# Patient Record
Sex: FEMALE | Race: WHITE | URBAN - METROPOLITAN AREA
[De-identification: names, ages, dates, MRNs, and addresses within clinical notes are randomized per-mention and may not be internally consistent; named-entity substitution may affect disease eponyms.]

---

## 2017-01-03 ENCOUNTER — OFFICE VISIT (OUTPATIENT)
Dept: FAMILY MEDICINE | Facility: CLINIC | Age: 22
End: 2017-01-03
Payer: COMMERCIAL

## 2017-01-03 VITALS
WEIGHT: 211 LBS | RESPIRATION RATE: 18 BRPM | HEART RATE: 119 BPM | BODY MASS INDEX: 33.91 KG/M2 | DIASTOLIC BLOOD PRESSURE: 86 MMHG | HEIGHT: 66 IN | SYSTOLIC BLOOD PRESSURE: 133 MMHG | OXYGEN SATURATION: 99 % | TEMPERATURE: 98.7 F

## 2017-01-03 DIAGNOSIS — F98.8 ADD (ATTENTION DEFICIT DISORDER): Primary | ICD-10-CM

## 2017-01-03 DIAGNOSIS — Z23 NEED FOR PROPHYLACTIC VACCINATION AND INOCULATION AGAINST INFLUENZA: ICD-10-CM

## 2017-01-03 DIAGNOSIS — J31.0 RHINITIS, UNSPECIFIED TYPE: ICD-10-CM

## 2017-01-03 DIAGNOSIS — R03.0 ELEVATED BLOOD PRESSURE READING WITHOUT DIAGNOSIS OF HYPERTENSION: ICD-10-CM

## 2017-01-03 PROCEDURE — 90471 IMMUNIZATION ADMIN: CPT | Performed by: INTERNAL MEDICINE

## 2017-01-03 PROCEDURE — 90686 IIV4 VACC NO PRSV 0.5 ML IM: CPT | Performed by: INTERNAL MEDICINE

## 2017-01-03 PROCEDURE — 99214 OFFICE O/P EST MOD 30 MIN: CPT | Mod: 25 | Performed by: INTERNAL MEDICINE

## 2017-01-03 RX ORDER — LORATADINE 10 MG/1
10 TABLET ORAL DAILY PRN
Qty: 30 TABLET | Refills: 1 | Status: SHIPPED | OUTPATIENT
Start: 2017-01-03

## 2017-01-03 RX ORDER — DEXTROAMPHETAMINE SACCHARATE, AMPHETAMINE ASPARTATE, DEXTROAMPHETAMINE SULFATE AND AMPHETAMINE SULFATE 3.75; 3.75; 3.75; 3.75 MG/1; MG/1; MG/1; MG/1
15 TABLET ORAL 2 TIMES DAILY
Qty: 60 TABLET | Refills: 0 | Status: SHIPPED | OUTPATIENT
Start: 2017-02-28 | End: 2017-04-26

## 2017-01-03 RX ORDER — DEXTROAMPHETAMINE SACCHARATE, AMPHETAMINE ASPARTATE, DEXTROAMPHETAMINE SULFATE AND AMPHETAMINE SULFATE 3.75; 3.75; 3.75; 3.75 MG/1; MG/1; MG/1; MG/1
15 TABLET ORAL 2 TIMES DAILY
Qty: 60 TABLET | Refills: 0 | Status: SHIPPED | OUTPATIENT
Start: 2017-03-29 | End: 2017-04-26

## 2017-01-03 RX ORDER — DEXTROAMPHETAMINE SACCHARATE, AMPHETAMINE ASPARTATE, DEXTROAMPHETAMINE SULFATE AND AMPHETAMINE SULFATE 1.875; 1.875; 1.875; 1.875 MG/1; MG/1; MG/1; MG/1
7.5 TABLET ORAL DAILY
Qty: 30 TABLET | Refills: 0 | Status: CANCELLED | OUTPATIENT
Start: 2017-01-03

## 2017-01-03 RX ORDER — DEXTROAMPHETAMINE SACCHARATE, AMPHETAMINE ASPARTATE, DEXTROAMPHETAMINE SULFATE AND AMPHETAMINE SULFATE 3.75; 3.75; 3.75; 3.75 MG/1; MG/1; MG/1; MG/1
15 TABLET ORAL 2 TIMES DAILY
Qty: 60 TABLET | Refills: 0 | Status: SHIPPED | OUTPATIENT
Start: 2017-01-30 | End: 2017-04-26

## 2017-01-03 RX ORDER — DEXTROAMPHETAMINE SACCHARATE, AMPHETAMINE ASPARTATE MONOHYDRATE, DEXTROAMPHETAMINE SULFATE AND AMPHETAMINE SULFATE 6.25; 6.25; 6.25; 6.25 MG/1; MG/1; MG/1; MG/1
25 CAPSULE, EXTENDED RELEASE ORAL DAILY
Qty: 30 CAPSULE | Refills: 0 | Status: CANCELLED | OUTPATIENT
Start: 2017-01-03

## 2017-01-03 RX ORDER — FLUTICASONE PROPIONATE 50 MCG
1 SPRAY, SUSPENSION (ML) NASAL
Qty: 1 BOTTLE | Refills: 3 | Status: SHIPPED | OUTPATIENT
Start: 2017-01-03

## 2017-01-03 ASSESSMENT — ANXIETY QUESTIONNAIRES
1. FEELING NERVOUS, ANXIOUS, OR ON EDGE: NOT AT ALL
2. NOT BEING ABLE TO STOP OR CONTROL WORRYING: NOT AT ALL
7. FEELING AFRAID AS IF SOMETHING AWFUL MIGHT HAPPEN: NOT AT ALL
IF YOU CHECKED OFF ANY PROBLEMS ON THIS QUESTIONNAIRE, HOW DIFFICULT HAVE THESE PROBLEMS MADE IT FOR YOU TO DO YOUR WORK, TAKE CARE OF THINGS AT HOME, OR GET ALONG WITH OTHER PEOPLE: NOT DIFFICULT AT ALL
3. WORRYING TOO MUCH ABOUT DIFFERENT THINGS: NOT AT ALL
GAD7 TOTAL SCORE: 0
6. BECOMING EASILY ANNOYED OR IRRITABLE: NOT AT ALL
5. BEING SO RESTLESS THAT IT IS HARD TO SIT STILL: NOT AT ALL

## 2017-01-03 ASSESSMENT — PATIENT HEALTH QUESTIONNAIRE - PHQ9: 5. POOR APPETITE OR OVEREATING: NOT AT ALL

## 2017-01-03 NOTE — MR AVS SNAPSHOT
After Visit Summary   1/3/2017    Aida Crook    MRN: [id number]           Patient Information     Date Of Birth          1995        Visit Information        Provider Department      1/3/2017 1:00 PM Xiomara Li MD [facility]        Today's Diagnoses     ADD (attention deficit disorder)    -  1     Need for prophylactic vaccination and inoculation against influenza         Elevated blood pressure reading without diagnosis of hypertension         Rhinitis, unspecified type           Care Instructions    Discontinue Adderall XR due to insurance reason.  Start taking Adderall 15 mg twice daily.  Monitor your blood pressure once a week  at home.  Bring those readings on your next visit.  Notify us if your blood pressure readings consistently stays greater than 140/90.  Schedule you physical in 3 months  Drink plenty of fluids.  Take extra rest.  Use saline nasal spray.  Take Tylenol as needed for pain  Use Claritin 10 mg daily as needed.  Use Flonase nasal  spray  Seek sooner medical attention if there is any worsening of symptoms or problems.          Follow-ups after your visit        Who to contact     If you have questions or need follow up information about today's clinic visit or your schedule please contact [facility] directly at [phone].  Normal or non-critical lab and imaging results will be communicated to you by MyChart, letter or phone within 4 business days after the clinic has received the results. If you do not hear from us within 7 days, please contact the clinic through [employer] or phone. If you have a critical or abnormal lab result, we will notify you by phone as soon as possible.  Submit refill requests through [employer] or call your pharmacy and they will forward the refill request to us. Please allow 3 business days for your refill to be completed.          Additional Information About Your Visit        MyChart Information     [employer] lets  "you send messages to your doctor, view your test results, renew your prescriptions, schedule appointments and more. To sign up, go to [email or web address] . Click on \"Log in\" on the left side of the screen, which will take you to the Welcome page. Then click on \"Sign up Now\" on the right side of the page.     You will be asked to enter the access code listed below, as well as some personal information. Please follow the directions to create your username and password.     Your access code is: [email or web address]  Expires: 4/3/2017  1:36 PM     Your access code will  in 90 days. If you need help or a new code, please call your [facility] clinic or [phone].        Care EveryWhere ID     This is your Care EveryWhere ID. This could be used by other organizations to access your [facility] medical records  [id number]        Your Vitals Were     Pulse Temperature Respirations    119 98.7  F (37.1  C) (Tympanic) 18    Height BMI (Body Mass Index) Pulse Oximetry    5' 6\" (1.676 m) 34.07 kg/m2 99%    Last Period Breastfeeding?       2016 (Approximate) No        Blood Pressure from Last 3 Encounters:   17 133/86   16 128/81   16 125/89    Weight from Last 3 Encounters:   17 211 lb (95.709 kg)   16 229 lb (103.874 kg)   16 230 lb 8 oz (104.554 kg)              We Performed the Following     FLU VAC, SPLIT VIRUS IM > 3 YO (QUADRIVALENT) [95564]     Vaccine Administration, Initial [92224]          Today's Medication Changes          These changes are accurate as of: 1/3/17  1:36 PM.  If you have any questions, ask your nurse or doctor.               Start taking these medicines.        Dose/Directions    * amphetamine-dextroamphetamine 15 MG per tablet   Commonly known as:  ADDERALL   Used for:  ADD (attention deficit disorder)   Started by:  Xiomara Li MD        Dose:  15 mg   Start taking on:  2017   Take 1 tablet (15 mg) by mouth 2 times daily   Quantity:  60 tablet "   Refills:  0       * amphetamine-dextroamphetamine 15 MG per tablet   Commonly known as:  ADDERALL   Used for:  ADD (attention deficit disorder)   Replaces:  amphetamine-dextroamphetamine 25 MG 24 hr capsule   Started by:  Xiomara Li MD        Dose:  15 mg   Start taking on:  2/28/2017   Take 1 tablet (15 mg) by mouth 2 times daily   Quantity:  60 tablet   Refills:  0       * amphetamine-dextroamphetamine 15 MG per tablet   Commonly known as:  ADDERALL   Used for:  ADD (attention deficit disorder)   Replaces:  amphetamine-dextroamphetamine 7.5 MG Tabs   Started by:  Xiomara Li MD        Dose:  15 mg   Start taking on:  3/29/2017   Take 1 tablet (15 mg) by mouth 2 times daily   Quantity:  60 tablet   Refills:  0       fluticasone 50 MCG/ACT spray   Commonly known as:  FLONASE   Used for:  Rhinitis, unspecified type   Started by:  Xiomara Li MD        Dose:  1 spray   Spray 1 spray into both nostrils 2 times daily   Quantity:  1 Bottle   Refills:  3       loratadine 10 MG tablet   Commonly known as:  CLARITIN   Used for:  Rhinitis, unspecified type   Started by:  Xiomara Li MD        Dose:  10 mg   Take 1 tablet (10 mg) by mouth daily as needed for allergies   Quantity:  30 tablet   Refills:  1       * Notice:  This list has 3 medication(s) that are the same as other medications prescribed for you. Read the directions carefully, and ask your doctor or other care provider to review them with you.      Stop taking these medicines if you haven't already. Please contact your care team if you have questions.     amphetamine-dextroamphetamine 25 MG 24 hr capsule   Commonly known as:  ADDERALL XR   Replaced by:  amphetamine-dextroamphetamine 15 MG per tablet   Stopped by:  Xiomara Li MD           amphetamine-dextroamphetamine 7.5 MG Tabs   Commonly known as:  ADDERALL   Replaced by:  amphetamine-dextroamphetamine 15 MG per tablet   Stopped by:  Xiomara Li MD                Where to  get your medicines      These medications were sent to [facility] - [place], [place] - [address], [address]  [address], [address], [facility] [address]     Phone:  [phone]    - fluticasone 50 MCG/ACT spray  - loratadine 10 MG tablet      Some of these will need a paper prescription and others can be bought over the counter.  Ask your nurse if you have questions.     Bring a paper prescription for each of these medications    - amphetamine-dextroamphetamine 15 MG per tablet  - amphetamine-dextroamphetamine 15 MG per tablet  - amphetamine-dextroamphetamine 15 MG per tablet             Primary Care Provider Office Phone # Fax #    Xiomara Li [phone]       [facility]    [address]  [facility] [address]        Thank you!     Thank you for choosing [facility]  for your care. Our goal is always to provide you with excellent care. Hearing back from our patients is one way we can continue to improve our services. Please take a few minutes to complete the written survey that you may receive in the mail after your visit with us. Thank you!             Your Updated Medication List - Protect others around you: Learn how to safely use, store and throw away your medicines at www.disposemymeds.org.          This list is accurate as of: 1/3/17  1:36 PM.  Always use your most recent med list.                   Brand Name Dispense Instructions for use    * amphetamine-dextroamphetamine 15 MG per tablet   Start taking on:  1/30/2017    ADDERALL    60 tablet    Take 1 tablet (15 mg) by mouth 2 times daily       * amphetamine-dextroamphetamine 15 MG per tablet   Start taking on:  2/28/2017    ADDERALL    60 tablet    Take 1 tablet (15 mg) by mouth 2 times daily       * amphetamine-dextroamphetamine 15 MG per tablet   Start taking on:  3/29/2017    ADDERALL    60 tablet    Take 1 tablet (15 mg) by mouth 2 times daily        fluticasone 50 MCG/ACT spray    FLONASE    1 Bottle    Spray 1 spray into both nostrils 2 times daily       loratadine 10 MG tablet    CLARITIN    30 tablet    Take 1 tablet (10 mg) by mouth daily as needed for allergies       * Notice:  This list has 3 medication(s) that are the same as other medications prescribed for you. Read the directions carefully, and ask your doctor or other care provider to review them with you.

## 2017-01-03 NOTE — PROGRESS NOTES
SUBJECTIVE:                                                    Aida Crook is a 21 year old female who presents to clinic today for the following health issues:      Medication Followup of  Adderall    Taking Medication as prescribed: YES    Side Effects:  None    Medication Helping Symptoms:  yes     Patient states that her ADD medication is working well for her  She has no concerns or known side effects  Wants to continue on the medication  Insurance will not cover XR    She is aware of her high blood pressure, has been told this was because of her adderall   Has a family history of hypertension  Denies heart palpitations  She does not have a blood pressure cuff at home  Was advised to buy one so she can take her blood pressure at home and bring readings into next visit  She will do this for her next visit  Patient has been working on getting exercise and a healthy diet  She has been consistently losing weight, 32lbs since visit in 12/2/14    Patient wants to replace her sudafed with a medication that doesn't cause a rise in blood pressure and heart rate  She uses this for nasal congestion and sinus pressure  Denies thick secretions  Has not tried allergy medication  Recommended to try saline nasal spray or neti pot  Will try allergy medications if these treatments do not help    Is due for a pap smear  Will schedule for a physical to get this and address her high blood pressure      Problem list and histories reviewed & adjusted, as indicated.  Additional history: as documented    Patient Active Problem List   Diagnosis     ADD (attention deficit disorder)     Elevated blood pressure reading without diagnosis of hypertension     Major depressive disorder, recurrent episode, mild (H)     Non morbid obesity, unspecified obesity type     Family history of colon cancer     Controlled substance agreement signed     Past Surgical History   Procedure Laterality Date     Orthopedic surgery       cyst removed  "from hand       Social History   Substance Use Topics     Smoking status: Never Smoker      Smokeless tobacco: Never Used      Comment: non smoking home     Alcohol Use: No     Family History   Problem Relation Age of Onset     Anemia Mother      Colon Cancer Mother      Hypertension Father      Family History Negative Sister          Current Outpatient Prescriptions   Medication Sig Dispense Refill     amphetamine-dextroamphetamine (ADDERALL) 7.5 MG TABS Take 7.5 mg by mouth daily 30 tablet 0     amphetamine-dextroamphetamine (ADDERALL XR) 25 MG 24 hr capsule Take 1 capsule (25 mg) by mouth daily 30 capsule 0     Allergies   Allergen Reactions     Penicillins      Sulfa Drugs      Tetracycline      Zoloft [Sertraline Hcl] Hives     Cefzil Rash       ROS:  Constitutional, HEENT, cardiovascular, pulmonary, gi and gu systems are negative, except as otherwise noted.    Nasal congestions  Sinus pressure    This document serves as a record of the services and decisions personally performed and made by Xiomara Li MD. It was created on her behalf by Ravi Walker, a trained medical scribe. The creation of this document is based on the provider's statements to the medical scribe.    Scribe Ravi Walker 1:08 PM, January 3, 2017    OBJECTIVE:                                                    /86 mmHg  Pulse 119  Temp(Src) 98.7  F (37.1  C) (Tympanic)  Resp 18  Ht 1.676 m (5' 6\")  Wt 95.709 kg (211 lb)  BMI 34.07 kg/m2  SpO2 99%  LMP 12/13/2016 (Approximate)  Breastfeeding? No  Body mass index is 34.07 kg/(m^2).  GENERAL APPEARANCE: healthy, alert and no distress  EYES: Eyes grossly normal to inspection, PERRL and conjunctivae and sclerae normal  HENT: TM's bulging, turbinate swelling, otherwise ear canals and nose and mouth without ulcers or lesions  NECK: no adenopathy  RESP: lungs clear to auscultation - no rales, rhonchi or wheezes  CV: regular rates and rhythm, normal S1 S2, no S3  PSYCH: mentation " appears normal and affect normal/bright       ASSESSMENT/PLAN:                                                      Aida was seen today for medication follow-up.    Diagnoses and all orders for this visit:    ADD (attention deficit disorder)  Patient is taking adderall to manage her ADD symptoms  She has no concerns or known side effects   Would like to continue use of this medication  Her insurance does not cover extended release  Medication was changed for these insurance reasons  -     amphetamine-dextroamphetamine (ADDERALL) 15 MG per tablet; Take 1 tablet (15 mg) by mouth 2 times daily  -     amphetamine-dextroamphetamine (ADDERALL) 15 MG per tablet; Take 1 tablet (15 mg) by mouth 2 times daily  -     amphetamine-dextroamphetamine (ADDERALL) 15 MG per tablet; Take 1 tablet (15 mg) by mouth 2 times daily    Elevated blood pressure reading without diagnosis of hypertension  Patient has a history of elevated blood pressure  She has been told adderall is a factor in this  Has a family history of hypertension  Discussed the importance of keeping BP under good control to reduce the risk of heart attack and stroke.   Instructed her to buy an arm cuff and take at home readings twice a week at home and bring those readings on next visit.  Notify us if bp readings consistently stay greater than 140/90 mmHg  Patient has been working on exercising and maintaining a healthy diet  She has lost 32 pounds since her visit on 12/2/14  Was advised to avoid salt in her diet  I will put her on BB as it is good for BP as well for tachycardia.    Rhinitis, unspecified type  Patient complains for frequent nasal congestion and sinus pressure  Denies thick secretions  She wants to replace her sudafed with a medication that doesn't cause a rise in blood pressure and heart rate  I advised her to drink plenty of fluids, take extra rest, and try saline nasal spray or neti pot  Use tylenol for pain  If symptoms persist try Claritin 10 mg  daily as needed and use flanase  -     fluticasone (FLONASE) 50 MCG/ACT spray; Spray 1 spray into both nostrils 2 times daily  -     loratadine (CLARITIN) 10 MG tablet; Take 1 tablet (10 mg) by mouth daily as needed for allergies    Need for prophylactic vaccination and inoculation against influenza  Patient is due for a flu shot  Would like to get one today  -     FLU VAC, SPLIT VIRUS IM > 3 YO (QUADRIVALENT) [32660]  -     Vaccine Administration, Initial [71056]    Other orders  -     Cancel: amphetamine-dextroamphetamine (ADDERALL) 7.5 MG TABS; Take 7.5 mg by mouth daily  -     Cancel: amphetamine-dextroamphetamine (ADDERALL XR) 25 MG 24 hr capsule; Take 1 capsule (25 mg) by mouth daily    I emphasized that she should complete her lab work before next visit  Discussed the treatment agreement and medication and its side effects and precautions    Follow up in 3 months for physical  Patient was advised to seek sooner medical attention if there is any new or worsening symptoms    The information in this document, created by the medical scribe for me, accurately reflects the services I personally performed and the decisions made by me. I have reviewed and approved this document for accuracy prior to leaving the patient care area.  Xiomara Li MD  1:09 PM, 01/03/2017    Xiomara Li MD  [facility]    Injectable Influenza Immunization Documentation    1.  Is the person to be vaccinated sick today?  No    2. Does the person to be vaccinated have an allergy to eggs or to a component of the vaccine?  No    3. Has the person to be vaccinated today ever had a serious reaction to influenza vaccine in the past?  No    4. Has the person to be vaccinated ever had Guillain-Tipton syndrome?  No     Form completed by patient. ANGELA Lou CMA January 3, 2017 1:00 PM

## 2017-01-03 NOTE — PATIENT INSTRUCTIONS
Discontinue Adderall XR due to insurance reason.  Start taking Adderall 15 mg twice daily.  Monitor your blood pressure once a week  at home.  Bring those readings on your next visit.  Notify us if your blood pressure readings consistently stays greater than 140/90.  Schedule you physical in 3 months  Drink plenty of fluids.  Take extra rest.  Use saline nasal spray.  Take Tylenol as needed for pain  Use Claritin 10 mg daily as needed.  Use Flonase nasal  Spray  Schedule labs   Seek sooner medical attention if there is any worsening of symptoms or problems.

## 2017-01-04 ASSESSMENT — PATIENT HEALTH QUESTIONNAIRE - PHQ9: SUM OF ALL RESPONSES TO PHQ QUESTIONS 1-9: 1

## 2017-01-04 ASSESSMENT — ANXIETY QUESTIONNAIRES: GAD7 TOTAL SCORE: 0

## 2017-04-26 ENCOUNTER — TELEPHONE (OUTPATIENT)
Dept: FAMILY MEDICINE | Facility: CLINIC | Age: 22
End: 2017-04-26

## 2017-04-26 DIAGNOSIS — F98.8 ADD (ATTENTION DEFICIT DISORDER): ICD-10-CM

## 2017-04-26 DIAGNOSIS — Z13.220 LIPID SCREENING: Primary | ICD-10-CM

## 2017-04-26 DIAGNOSIS — Z13.29 SCREENING FOR THYROID DISORDER: ICD-10-CM

## 2017-04-26 DIAGNOSIS — Z13.1 SCREENING FOR DIABETES MELLITUS: ICD-10-CM

## 2017-04-26 RX ORDER — DEXTROAMPHETAMINE SACCHARATE, AMPHETAMINE ASPARTATE, DEXTROAMPHETAMINE SULFATE AND AMPHETAMINE SULFATE 3.75; 3.75; 3.75; 3.75 MG/1; MG/1; MG/1; MG/1
15 TABLET ORAL 2 TIMES DAILY
Qty: 60 TABLET | Refills: 0 | Status: SHIPPED | OUTPATIENT
Start: 2017-04-26 | End: 2017-05-26

## 2017-04-26 RX ORDER — DEXTROAMPHETAMINE SACCHARATE, AMPHETAMINE ASPARTATE, DEXTROAMPHETAMINE SULFATE AND AMPHETAMINE SULFATE 3.75; 3.75; 3.75; 3.75 MG/1; MG/1; MG/1; MG/1
15 TABLET ORAL 2 TIMES DAILY
Qty: 60 TABLET | Refills: 0 | Status: SHIPPED | OUTPATIENT
Start: 2017-05-27 | End: 2017-06-26

## 2017-04-26 RX ORDER — DEXTROAMPHETAMINE SACCHARATE, AMPHETAMINE ASPARTATE, DEXTROAMPHETAMINE SULFATE AND AMPHETAMINE SULFATE 3.75; 3.75; 3.75; 3.75 MG/1; MG/1; MG/1; MG/1
15 TABLET ORAL 2 TIMES DAILY
Qty: 60 TABLET | Refills: 0 | Status: SHIPPED | OUTPATIENT
Start: 2017-06-27 | End: 2017-07-24

## 2017-04-26 NOTE — TELEPHONE ENCOUNTER
amphetamine-dextroamphetamine (ADDERALL) 15 MG per tablet      Last Written Prescription Date: 3/29/2017  Last Fill Quantity: 60,  # refills: 0   Last Office Visit with [facility], [facility] or [facility] prescribing provider: 1/3/2017

## 2017-04-26 NOTE — TELEPHONE ENCOUNTER
Reason for Call:  Other labs    Detailed comments: patient scheduled fasting lab appt for ,  per notes from last visit with dr garrison, but last future orders are . Needs new orders.       Call taken on 2017 at 12:58 PM by Jasmina An

## 2017-04-26 NOTE — TELEPHONE ENCOUNTER
Dr. Li,     It appears that patient regularly gets 3 month printed scripts for medication  Please see pended scripts.     Cherie Chandler RN

## 2017-04-26 NOTE — TELEPHONE ENCOUNTER
Reason for Call:  Other prescription    Detailed comments: patient wants to get rx for adderall and pick it up at the clinic thurs 4/27 if possible.     Phone Number Patient can be reached at: [phone]    Best Time: any    Can we leave a detailed message on this number? YES    Call taken on 4/26/2017 at 12:46 PM by Jasmina An

## 2017-04-28 DIAGNOSIS — Z13.220 LIPID SCREENING: ICD-10-CM

## 2017-04-28 DIAGNOSIS — Z13.29 SCREENING FOR THYROID DISORDER: ICD-10-CM

## 2017-04-28 DIAGNOSIS — Z13.1 SCREENING FOR DIABETES MELLITUS: ICD-10-CM

## 2017-04-28 PROCEDURE — 36415 COLL VENOUS BLD VENIPUNCTURE: CPT | Performed by: INTERNAL MEDICINE

## 2017-04-28 PROCEDURE — 82947 ASSAY GLUCOSE BLOOD QUANT: CPT | Performed by: INTERNAL MEDICINE

## 2017-04-28 PROCEDURE — 84443 ASSAY THYROID STIM HORMONE: CPT | Performed by: INTERNAL MEDICINE

## 2017-04-28 PROCEDURE — 80061 LIPID PANEL: CPT | Performed by: INTERNAL MEDICINE

## 2017-04-28 NOTE — LETTER
[facility]  [address] [facility]  [address]  [place]  [address]  Tel: [phone]    May 1, 2017    Aida Crook  [address]  [facility] [address]        Dear MsNahomy Crook,    Your total cholesterol is normal.  HDL which is called good cholesterol is normal.  Your LDL cholesterol is normal.  This is often call bad cholesterol and high levels increase the risk for heart attacks and strokes.  Your triglycerides are normal.  Glucose which is your blood sugar is normal.  TSH which is thyroid hormone is normal.    If you have any further questions or problems, please contact our office.      Sincerely,    Xiomara Li MD/kaye    Results for orders placed or performed in visit on 04/28/17   Lipid panel reflex to direct LDL   Result Value Ref Range    Cholesterol 165 <200 mg/dL    Triglycerides 78 <150 mg/dL    HDL Cholesterol 74 >49 mg/dL    LDL Cholesterol Calculated 75 <100 mg/dL    Non HDL Cholesterol 91 <130 mg/dL   TSH with free T4 reflex   Result Value Ref Range    TSH 1.91 0.40 - 4.00 mU/L   Glucose   Result Value Ref Range    Glucose 80 70 - 99 mg/dL           Enclosure: Lab Results

## 2017-04-29 LAB
CHOLEST SERPL-MCNC: 165 MG/DL
GLUCOSE SERPL-MCNC: 80 MG/DL (ref 70–99)
HDLC SERPL-MCNC: 74 MG/DL
LDLC SERPL CALC-MCNC: 75 MG/DL
NONHDLC SERPL-MCNC: 91 MG/DL
TRIGL SERPL-MCNC: 78 MG/DL
TSH SERPL DL<=0.005 MIU/L-ACNC: 1.91 MU/L (ref 0.4–4)

## 2017-04-30 NOTE — PROGRESS NOTES
Please Notify the patient  Your total cholesterol is normal.  HDL which is called good cholesterol is normal.  Your LDL cholesterol is normal.  This is often call bad cholesterol and high levels increase the risk for heart attacks and strokes.  Your triglycerides are normal.  Glucose which is your blood sugar is normal.  TSH which is thyroid hormone is normal.  Please send the copy of lab results also to this patient.

## 2017-07-07 ENCOUNTER — NURSE TRIAGE (OUTPATIENT)
Dept: NURSING | Facility: CLINIC | Age: 22
End: 2017-07-07

## 2017-07-07 ENCOUNTER — OFFICE VISIT (OUTPATIENT)
Dept: URGENT CARE | Facility: URGENT CARE | Age: 22
End: 2017-07-07
Payer: COMMERCIAL

## 2017-07-07 DIAGNOSIS — S61.311A LACERATION OF LEFT INDEX FINGER WITHOUT FOREIGN BODY WITH DAMAGE TO NAIL, INITIAL ENCOUNTER: Primary | ICD-10-CM

## 2017-07-07 PROCEDURE — 99212 OFFICE O/P EST SF 10 MIN: CPT | Performed by: FAMILY MEDICINE

## 2017-07-07 NOTE — MR AVS SNAPSHOT
"              After Visit Summary   2017    Aida Crook    MRN: [id number]           Patient Information     Date Of Birth          1995        Visit Information        Provider Department      2017 5:00 PM Zaynab Cristobal MD [facility]        Today's Diagnoses     Laceration of left index finger without foreign body with damage to nail, initial encounter    -  1       Follow-ups after your visit        Who to contact     If you have questions or need follow up information about today's clinic visit or your schedule please contact [facility] directly at [phone].  Normal or non-critical lab and imaging results will be communicated to you by [employer], letter or phone within 4 business days after the clinic has received the results. If you do not hear from us within 7 days, please contact the clinic through [employer] or phone. If you have a critical or abnormal lab result, we will notify you by phone as soon as possible.  Submit refill requests through [employer] or call your pharmacy and they will forward the refill request to us. Please allow 3 business days for your refill to be completed.          Additional Information About Your Visit        MyChart Information     [employer] lets you send messages to your doctor, view your test results, renew your prescriptions, schedule appointments and more. To sign up, go to [email or web address] . Click on \"Log in\" on the left side of the screen, which will take you to the Welcome page. Then click on \"Sign up Now\" on the right side of the page.     You will be asked to enter the access code listed below, as well as some personal information. Please follow the directions to create your username and password.     Your access code is: [email or web address]  Expires: 10/6/2017  5:55 AM     Your access code will  in 90 days. If you need help or a new code, please call your [facility] clinic or " [phone].        Care EveryWhere ID     This is your Care EveryWhere ID. This could be used by other organizations to access your [facility] medical records  [id number]         Blood Pressure from Last 3 Encounters:   01/03/17 133/86   08/03/16 128/81   07/25/16 125/89    Weight from Last 3 Encounters:   01/03/17 211 lb (95.7 kg)   08/03/16 229 lb (103.9 kg)   07/25/16 230 lb 8 oz (104.6 kg)              Today, you had the following     No orders found for display       Primary Care Provider Office Phone # Fax #    Xiomara KANWAL Li [phone]       [facility]    [address] [facility] [address]  [facility] [address]        Equal Access to Services     JOVAN BLACK : Hadii aad ku hadasho Sodee, waaxda luqadaha, qaybta kaalmada adeegyada, waxay jessin haysahara moore . So [facility] [phone].    ATENCIÓN: Si habla español, tiene a orantes disposición servicios gratuitos de asistencia lingüística. Anthony al [phone].    We comply with applicable federal civil rights laws and [place] laws. We do not discriminate on the basis of race, color, national origin, age, disability sex, sexual orientation or gender identity.            Thank you!     Thank you for choosing [facility]  for your care. Our goal is always to provide you with excellent care. Hearing back from our patients is one way we can continue to improve our services. Please take a few minutes to complete the written survey that you may receive in the mail after your visit with us. Thank you!             Your Updated Medication List - Protect others around you: Learn how to safely use, store and throw away your medicines at www.disposemymeds.org.          This list is accurate as of: 7/7/17 11:59 PM.  Always use your most recent med list.                   Brand Name Dispense Instructions for use Diagnosis    amphetamine-dextroamphetamine 15 MG per tablet    ADDERALL    60 tablet     Take 1 tablet (15 mg) by mouth 2 times daily    ADD (attention deficit disorder)       fluticasone 50 MCG/ACT spray    FLONASE    1 Bottle    Spray 1 spray into both nostrils 2 times daily    Rhinitis, unspecified type       loratadine 10 MG tablet    CLARITIN    30 tablet    Take 1 tablet (10 mg) by mouth daily as needed for allergies    Rhinitis, unspecified type

## 2017-07-07 NOTE — TELEPHONE ENCOUNTER
Reason for Disposition    Fingernail is completely torn off (fingernail avulsion)    Additional Information    Negative: [1] Major bleeding (e.g., actively dripping or spurting) AND [2] can't be stopped    Negative: Amputation    Negative: Shock suspected (e.g., cold/pale/clammy skin, too weak to stand, low BP, rapid pulse)    Negative: [1] Knife wound (or other possibly deep cut) AND [2] to chest, abdomen, back, neck, or head    Negative: [1] Cutter (self-mutilator) AND [2] suicidal or out-of-control    Negative: Sounds like a life-threatening emergency to the triager    Negative: [1] Animal bite AND [2] broken skin    Negative: [1] Human bite AND [2] broken skin    Negative: [1] Bleeding AND [2] won't stop after 10 minutes of direct pressure (using correct technique)    Negative: Skin is split open or gaping  (or length > 1/2 inch or 12 mm on the skin, 1/4 inch or 6 mm on the face)    Negative: [1] Deep cut AND [2] can see bone or tendons    Negative: Sensation of something in the wound (i.e., retained object in wound)    Negative: [1] Dirt in the wound AND [2] not removed with 15 minutes of scrubbing    Negative: Wound causes numbness (i.e., loss of sensation)    Negative: Wound causes weakness (i.e., decreased ability to move hand, finger, toe)    Negative: [1] SEVERE pain AND [2] not improved 2 hours after pain medicine    Negative: [1] Looks infected AND [2] large red area (>2 inches or 5 cm) or streak    Negative: [1] Fever AND [2] bright red area or streak    Negative: Suspicious history for the injury    Negative: [1] Looks infected (spreading redness, pus) AND [2] no fever    Negative: [1] Major bleeding (e.g., spurting blood) AND [2] can't be stopped    Negative: Wound looks infected    Negative: Caused by animal bite    Negative: Caused by human bite    Negative: Amputated finger    Negative: Skin is split open or gaping  (or length > 1/2 inch or 12 mm)    Negative: [1] Bleeding AND [2] won't stop  "after 10 minutes of direct pressure (using correct technique)    Negative: [1] Dirt in the wound AND [2] not removed with 15 minutes of scrubbing    Negative: High pressure injection injury (e.g., from grease gun or paint gun, usually work-related)    Negative: Looks like a broken bone (e.g., crooked or deformed)    Negative: Looks like a dislocated joint (e.g., crooked or deformed)    Negative: [1] Fingernail is partially torn AND [2] from crush injury  (Exception: torn nail from catching it on something)    Negative: [1] Cut AND [2] numbness (loss of sensation) of finger    Negative: Sounds like a serious injury to the triager    Negative: [1] Cut AND [2] finger joint can't be opened (straightened) or closed (bent) completely    Negative: [1] SEVERE pain AND [2] not improved 2 hours after pain medicine/ice packs    Negative: [1] MODERATE-SEVERE pain AND [2] blood present under a nail    Protocols used: FINGER INJURY-ADULT-AH, CUTS AND LACERATIONS-ADULT-AH    Mom calling to ask if the [facility] \"could treat Lauren's finger injury or if we need to go to ER?\" Per mom, \"lauren was cutting and peeling carrots about 15 minutes ago and cut part of her nail off\". Per mom, the patient \"has her finger wrapped in gauze, is holding pressure and holding her arm up to help stop the bleeding\". Mom unsure what the wound actually looks like as it is covered. Advised mom to bring to urgent care and if staff there unable to treat the wound they would send them to the ER for care. Mom thought she was calling the urgent care directly and sounded very rushed during call.    Debora Marrero RN  [facility]  "

## 2017-07-08 NOTE — PROGRESS NOTES
SUBJECTIVE:                                                    Aida Crook is a 21 year old female who presents to clinic today for the following health issues:    Finger laceration        Duration: today-    Description  Location: left index finger-patient was peeling a carrot and carlton cut off piece of tissue and portion of nail, active bleeding, decreased with local pressure, continues to ooze    Intensity:  moderate    Accompanying signs and symptoms: None    History (similar episodes/previous evaluation): None    Precipitating or alleviating factors:  Increase bleeding when hand not elevated/use of hand      Therapies tried and outcome: local pressure        Problem list and histories reviewed & adjusted, as indicated.  Additional history: as documented    Patient Active Problem List   Diagnosis     ADD (attention deficit disorder)     Elevated blood pressure reading without diagnosis of hypertension     Major depressive disorder, recurrent episode, mild (H)     Non morbid obesity, unspecified obesity type     Family history of colon cancer     Controlled substance agreement signed     Past Surgical History:   Procedure Laterality Date     ORTHOPEDIC SURGERY      cyst removed from hand       Social History   Substance Use Topics     Smoking status: Never Smoker     Smokeless tobacco: Never Used      Comment: non smoking home     Alcohol use No     Family History   Problem Relation Age of Onset     Anemia Mother      Colon Cancer Mother      Hypertension Mother      Hypertension Father      Family History Negative Sister          Current Outpatient Prescriptions   Medication Sig Dispense Refill     amphetamine-dextroamphetamine (ADDERALL) 15 MG per tablet Take 1 tablet (15 mg) by mouth 2 times daily 60 tablet 0     fluticasone (FLONASE) 50 MCG/ACT spray Spray 1 spray into both nostrils 2 times daily 1 Bottle 3     loratadine (CLARITIN) 10 MG tablet Take 1 tablet (10 mg) by mouth daily as needed for  allergies 30 tablet 1     Allergies   Allergen Reactions     Penicillins      Sulfa Drugs      Tetracycline      Zoloft [Sertraline Hcl] Hives     Cefzil Rash     Recent Labs   Lab Test  04/28/17   0956  04/07/16   1402 09/20/13 05/20/09   1646   LDL  75   --    --    --    HDL  74   --    --    --    TRIG  78   --    --    --    ALT   --   23  25   --    CR   --   0.89  0.81  0.81*   GFRESTIMATED   --   81   --   GFR not calculated, patient <16 years old.   GFRESTBLACK   --   >90   GFR Calc     --   GFR not calculated, patient <16 years old.   POTASSIUM   --   4.4  4.1  4.6   TSH  1.91   --   2.620  2.16      BP Readings from Last 3 Encounters:   01/03/17 133/86   08/03/16 128/81   07/25/16 125/89    Wt Readings from Last 3 Encounters:   01/03/17 211 lb (95.7 kg)   08/03/16 229 lb (103.9 kg)   07/25/16 230 lb 8 oz (104.6 kg)                  Labs reviewed in EPIC    Reviewed and updated as needed this visit by clinical staff       Reviewed and updated as needed this visit by Provider         ROS:  Constitutional, HEENT, cardiovascular, pulmonary, gi and gu systems are negative, except as otherwise noted.    OBJECTIVE:     There were no vitals taken for this visit.  There is no height or weight on file to calculate BMI.   GENERAL: healthy, alert and no distress  MS: left hand DIP-FROM of dip/pip/mcp, normal sensation  Skin laceration into tip of dip that excised a small divot of tissue and small portion of distal nail, brisk bleeding, resolved with direct pressure      ASSESSMENT/PLAN:     Problem List Items Addressed This Visit     None      Visit Diagnoses     Laceration of left index finger without foreign body with damage to nail, initial encounter    -  Primary         Advised patient that there was no tissue that could be sutured, but area needed to be cleaned/soaked and an appropriate clean dressing placed to prevent further bleeding  Explained that there were several people ahead of her to  be seen and temporary dressing placed/pt to waiting room/patient later called back to be seen by provider and patient had left the clinic AMA.     Zaynab Cristobal MD  [facility]

## 2017-07-24 ENCOUNTER — TELEPHONE (OUTPATIENT)
Dept: FAMILY MEDICINE | Facility: CLINIC | Age: 22
End: 2017-07-24

## 2017-07-24 DIAGNOSIS — F98.8 ATTENTION DEFICIT DISORDER, UNSPECIFIED HYPERACTIVITY PRESENCE: Primary | ICD-10-CM

## 2017-07-24 RX ORDER — DEXTROAMPHETAMINE SACCHARATE, AMPHETAMINE ASPARTATE, DEXTROAMPHETAMINE SULFATE AND AMPHETAMINE SULFATE 3.75; 3.75; 3.75; 3.75 MG/1; MG/1; MG/1; MG/1
15 TABLET ORAL 2 TIMES DAILY
Qty: 60 TABLET | Refills: 0 | Status: SHIPPED | OUTPATIENT
Start: 2017-07-24

## 2017-07-24 NOTE — TELEPHONE ENCOUNTER
Reason for Call:  Other prescription    Detailed comments: amphetamine-dextroamphetamine (ADDERALL) 15 MG per tablet 60 tablets. Please call pt when ready for  at . Pt. States she will be out of medication by tomorrow 7/25.     Phone Number Patient can be reached at: Home number on file [phone] (home)    Best Time: any    Can we leave a detailed message on this number? YES    Call taken on 7/24/2017 at 2:04 PM by Clover Crisostomo

## 2017-07-24 NOTE — TELEPHONE ENCOUNTER
amphetamine-dextroamphetamine (ADDERALL) 15 MG per tablet 60 tablet 0 6/27/2017 7/27/2017 No   Sig: Take 1 tablet (15 mg) by mouth 2 times daily          Last Written Prescription Date: 06/27/2017  Last Fill Quantity: 60,  # refills: 0   Last Office Visit with [facility], [facility] or [facility] prescribing provider: 01/03/2017              Controlled substance agreement on file 8-3-2016